# Patient Record
Sex: FEMALE | Race: WHITE | Employment: FULL TIME | URBAN - METROPOLITAN AREA
[De-identification: names, ages, dates, MRNs, and addresses within clinical notes are randomized per-mention and may not be internally consistent; named-entity substitution may affect disease eponyms.]

---

## 2024-11-20 ENCOUNTER — HOSPITAL ENCOUNTER (OUTPATIENT)
Facility: HOSPITAL | Age: 44
Discharge: HOME | End: 2024-11-23
Payer: COMMERCIAL

## 2024-11-20 DIAGNOSIS — Z01.818 PRE-OP TESTING: ICD-10-CM

## 2024-11-20 LAB
ANION GAP SERPL CALC-SCNC: 5 MMOL/L (ref 3–18)
BASOPHILS # BLD: 0 K/UL (ref 0–0.1)
BASOPHILS NFR BLD: 1 % (ref 0–2)
BUN SERPL-MCNC: 13 MG/DL (ref 7–18)
BUN/CREAT SERPL: 15 (ref 12–20)
CALCIUM SERPL-MCNC: 8.8 MG/DL (ref 8.5–10.1)
CHLORIDE SERPL-SCNC: 106 MMOL/L (ref 100–111)
CO2 SERPL-SCNC: 28 MMOL/L (ref 21–32)
CREAT SERPL-MCNC: 0.88 MG/DL (ref 0.6–1.3)
DIFFERENTIAL METHOD BLD: ABNORMAL
EKG ATRIAL RATE: 61 BPM
EKG DIAGNOSIS: NORMAL
EKG P AXIS: 72 DEGREES
EKG P-R INTERVAL: 144 MS
EKG Q-T INTERVAL: 414 MS
EKG QRS DURATION: 80 MS
EKG QTC CALCULATION (BAZETT): 416 MS
EKG R AXIS: 74 DEGREES
EKG T AXIS: 68 DEGREES
EKG VENTRICULAR RATE: 61 BPM
EOSINOPHIL # BLD: 0.3 K/UL (ref 0–0.4)
EOSINOPHIL NFR BLD: 5 % (ref 0–5)
ERYTHROCYTE [DISTWIDTH] IN BLOOD BY AUTOMATED COUNT: 13.1 % (ref 11.6–14.5)
GLUCOSE SERPL-MCNC: 106 MG/DL (ref 74–99)
HCT VFR BLD AUTO: 36.2 % (ref 35–45)
HGB BLD-MCNC: 12.2 G/DL (ref 12–16)
IMM GRANULOCYTES # BLD AUTO: 0 K/UL (ref 0–0.04)
IMM GRANULOCYTES NFR BLD AUTO: 0 % (ref 0–0.5)
LYMPHOCYTES # BLD: 2.7 K/UL (ref 0.9–3.6)
LYMPHOCYTES NFR BLD: 47 % (ref 21–52)
MCH RBC QN AUTO: 30.1 PG (ref 24–34)
MCHC RBC AUTO-ENTMCNC: 33.7 G/DL (ref 31–37)
MCV RBC AUTO: 89.4 FL (ref 78–100)
MONOCYTES # BLD: 0.5 K/UL (ref 0.05–1.2)
MONOCYTES NFR BLD: 9 % (ref 3–10)
NEUTS SEG # BLD: 2.2 K/UL (ref 1.8–8)
NEUTS SEG NFR BLD: 38 % (ref 40–73)
NRBC # BLD: 0 K/UL (ref 0–0.01)
NRBC BLD-RTO: 0 PER 100 WBC
PLATELET # BLD AUTO: 228 K/UL (ref 135–420)
PMV BLD AUTO: 8.6 FL (ref 9.2–11.8)
POTASSIUM SERPL-SCNC: 4.3 MMOL/L (ref 3.5–5.5)
RBC # BLD AUTO: 4.05 M/UL (ref 4.2–5.3)
SODIUM SERPL-SCNC: 139 MMOL/L (ref 136–145)
WBC # BLD AUTO: 5.7 K/UL (ref 4.6–13.2)

## 2024-11-20 PROCEDURE — 85025 COMPLETE CBC W/AUTO DIFF WBC: CPT

## 2024-11-20 PROCEDURE — 93005 ELECTROCARDIOGRAM TRACING: CPT | Performed by: SURGERY

## 2024-11-20 PROCEDURE — 80048 BASIC METABOLIC PNL TOTAL CA: CPT

## 2024-11-20 RX ORDER — AMLODIPINE BESYLATE 5 MG/1
5 TABLET ORAL NIGHTLY
COMMUNITY

## 2024-11-20 RX ORDER — M-VIT,TX,IRON,MINS/CALC/FOLIC 27MG-0.4MG
1 TABLET ORAL DAILY
COMMUNITY

## 2024-11-20 RX ORDER — PHENOL 1.4 %
10 AEROSOL, SPRAY (ML) MUCOUS MEMBRANE NIGHTLY
COMMUNITY

## 2024-11-20 RX ORDER — BUSPIRONE HYDROCHLORIDE 15 MG/1
15 TABLET ORAL 2 TIMES DAILY
COMMUNITY

## 2024-11-20 RX ORDER — ALBUTEROL SULFATE 90 UG/1
2 INHALANT RESPIRATORY (INHALATION) EVERY 6 HOURS PRN
COMMUNITY

## 2024-11-20 RX ORDER — TRAZODONE HYDROCHLORIDE 100 MG/1
100 TABLET ORAL NIGHTLY
COMMUNITY

## 2024-11-20 RX ORDER — LEVOTHYROXINE SODIUM 75 UG/1
75 TABLET ORAL DAILY
COMMUNITY

## 2024-11-20 RX ORDER — LOSARTAN POTASSIUM 25 MG/1
25 TABLET ORAL DAILY
COMMUNITY

## 2024-11-20 RX ORDER — HYDROCHLOROTHIAZIDE 12.5 MG/1
6.25 CAPSULE ORAL DAILY
COMMUNITY

## 2024-11-20 RX ORDER — POLYETHYLENE GLYCOL 3350 17 G/17G
17 POWDER, FOR SOLUTION ORAL DAILY
COMMUNITY

## 2024-11-20 RX ORDER — DULOXETIN HYDROCHLORIDE 30 MG/1
30 CAPSULE, DELAYED RELEASE ORAL DAILY
COMMUNITY

## 2024-11-20 NOTE — PERIOP NOTE
Patient denies diabetes and not on Lithium or Digoxin.     Blood specimen sent to lab for: CBC w/diff and BMP. EKG done today.

## 2024-11-20 NOTE — PERIOP NOTE
SURGICAL PRE-ADMISSION INSTRUCTIONS    Your Medication instructions:    Bring albuterol inhaler on day of surgery and use if needed.    Take or Use these medications the morning of surgery with a sip of water: Levothyroxine, Cymbalta, Buspar    Do Not Take morning of surgery: Losartan, HCTZ        Follow instructions about insulin (Name of Long Acting Insulin) take 1/2 of the prescribed dose the night prior to surgery (Date).    Wear your HOME insulin pump to the hospital. On the morning of surgery, the continued use of the pump will be determined by the type of surgery procedure/expected length of surgery/positioning/equipment expected. Bring EXTRA infusion supplies with you to the hospital.    Follow surgeon's instructions for stopping NSAIDs (i.e. Ibuprofen/Motrin, Advil, BC powder, Aleve/Naproxen, Voltaren cream or tablet, Mobic/Meloxicam, Excedrin, Celebrex, Ketorolac/Toradol, etc). If no instructions provided stop NSAIDs 7 days prior to surgery.    Stop supplements and vitamins (except multivitamins-stop the night prior to surgery) 2 weeks prior to surgery.    Patient denies being on any eye drops, ointment/lotions/gels, patches, injectable medications, phentermine, supplements, vitamins, and herbals at this time. Instructed patient to please notify Preanesthesia Testing at [phone] with any medication changes after the PAT phone appointment.    General Day Surgery Instructions    Do NOT eat or drink anything, including water, ice chips, soda, candy, or gum after MIDNIGHT on the night before surgery, unless you have specific instructions from your Surgeon or Anesthesia Provider to do so.   Please follow instructions for cleaning skin with CHG wash kit 3 days prior to surgery as directed.   Please shower with antibacterial bar soap 2 hours prior to arrival on day of surgery.  No smoking/vaping/chewing tobacco products 24 hours before surgery.  No recreational drugs for one week prior to the day of

## 2024-11-27 ENCOUNTER — ANESTHESIA EVENT (OUTPATIENT)
Facility: HOSPITAL | Age: 44
End: 2024-11-27
Payer: COMMERCIAL

## 2024-11-27 ENCOUNTER — ANESTHESIA (OUTPATIENT)
Facility: HOSPITAL | Age: 44
End: 2024-11-27
Payer: COMMERCIAL

## 2024-11-27 ENCOUNTER — HOSPITAL ENCOUNTER (OUTPATIENT)
Facility: HOSPITAL | Age: 44
Setting detail: OUTPATIENT SURGERY
Discharge: HOME | End: 2024-11-27
Attending: SURGERY | Admitting: SURGERY
Payer: COMMERCIAL

## 2024-11-27 VITALS
SYSTOLIC BLOOD PRESSURE: 111 MMHG | HEIGHT: 66 IN | RESPIRATION RATE: 20 BRPM | BODY MASS INDEX: 22.34 KG/M2 | TEMPERATURE: 97.3 F | WEIGHT: 139 LBS | DIASTOLIC BLOOD PRESSURE: 65 MMHG | OXYGEN SATURATION: 97 % | HEART RATE: 90 BPM

## 2024-11-27 DIAGNOSIS — K40.91 UNILATERAL RECURRENT INGUINAL HERNIA WITHOUT OBSTRUCTION OR GANGRENE: ICD-10-CM

## 2024-11-27 DIAGNOSIS — Z01.818 PRE-OP TESTING: Primary | ICD-10-CM

## 2024-11-27 PROCEDURE — 6370000000 HC RX 637 (ALT 250 FOR IP): Performed by: ANESTHESIOLOGY

## 2024-11-27 PROCEDURE — 2580000003 HC RX 258: Performed by: SURGERY

## 2024-11-27 PROCEDURE — 6360000002 HC RX W HCPCS: Performed by: NURSE ANESTHETIST, CERTIFIED REGISTERED

## 2024-11-27 PROCEDURE — 6360000002 HC RX W HCPCS: Performed by: SURGERY

## 2024-11-27 PROCEDURE — 2500000003 HC RX 250 WO HCPCS: Performed by: NURSE ANESTHETIST, CERTIFIED REGISTERED

## 2024-11-27 PROCEDURE — 6360000002 HC RX W HCPCS: Performed by: ANESTHESIOLOGY

## 2024-11-27 RX ORDER — OXYCODONE HYDROCHLORIDE 5 MG/1
10 TABLET ORAL PRN
Status: COMPLETED | OUTPATIENT
Start: 2024-11-27 | End: 2024-11-27

## 2024-11-27 RX ORDER — ROCURONIUM BROMIDE 10 MG/ML
INJECTION, SOLUTION INTRAVENOUS
Status: DISCONTINUED | OUTPATIENT
Start: 2024-11-27 | End: 2024-11-27 | Stop reason: SDUPTHER

## 2024-11-27 RX ORDER — OXYCODONE HYDROCHLORIDE 5 MG/1
5 TABLET ORAL EVERY 6 HOURS PRN
Qty: 10 TABLET | Refills: 0 | Status: SHIPPED | OUTPATIENT
Start: 2024-11-27 | End: 2024-11-30

## 2024-11-27 RX ORDER — PROPOFOL 10 MG/ML
INJECTION, EMULSION INTRAVENOUS
Status: DISCONTINUED | OUTPATIENT
Start: 2024-11-27 | End: 2024-11-27 | Stop reason: SDUPTHER

## 2024-11-27 RX ORDER — ONDANSETRON 2 MG/ML
INJECTION INTRAMUSCULAR; INTRAVENOUS
Status: DISCONTINUED | OUTPATIENT
Start: 2024-11-27 | End: 2024-11-27 | Stop reason: SDUPTHER

## 2024-11-27 RX ORDER — ONDANSETRON 2 MG/ML
4 INJECTION INTRAMUSCULAR; INTRAVENOUS
Status: DISCONTINUED | OUTPATIENT
Start: 2024-11-27 | End: 2024-11-27 | Stop reason: HOSPADM

## 2024-11-27 RX ORDER — SODIUM CHLORIDE 9 MG/ML
INJECTION, SOLUTION INTRAVENOUS CONTINUOUS
Status: DISCONTINUED | OUTPATIENT
Start: 2024-11-27 | End: 2024-11-27 | Stop reason: HOSPADM

## 2024-11-27 RX ORDER — HYDROMORPHONE HYDROCHLORIDE 1 MG/ML
0.5 INJECTION, SOLUTION INTRAMUSCULAR; INTRAVENOUS; SUBCUTANEOUS EVERY 5 MIN PRN
Status: COMPLETED | OUTPATIENT
Start: 2024-11-27 | End: 2024-11-27

## 2024-11-27 RX ORDER — FENTANYL CITRATE 50 UG/ML
INJECTION, SOLUTION INTRAMUSCULAR; INTRAVENOUS
Status: DISCONTINUED | OUTPATIENT
Start: 2024-11-27 | End: 2024-11-27 | Stop reason: SDUPTHER

## 2024-11-27 RX ORDER — MIDAZOLAM HYDROCHLORIDE 1 MG/ML
INJECTION, SOLUTION INTRAMUSCULAR; INTRAVENOUS
Status: DISCONTINUED | OUTPATIENT
Start: 2024-11-27 | End: 2024-11-27 | Stop reason: SDUPTHER

## 2024-11-27 RX ORDER — OXYCODONE HYDROCHLORIDE 5 MG/1
5 TABLET ORAL PRN
Status: COMPLETED | OUTPATIENT
Start: 2024-11-27 | End: 2024-11-27

## 2024-11-27 RX ORDER — EPHEDRINE SULFATE 5 MG/ML
INJECTION INTRAVENOUS
Status: DISCONTINUED | OUTPATIENT
Start: 2024-11-27 | End: 2024-11-27 | Stop reason: SDUPTHER

## 2024-11-27 RX ORDER — KETOROLAC TROMETHAMINE 30 MG/ML
INJECTION, SOLUTION INTRAMUSCULAR; INTRAVENOUS
Status: DISCONTINUED | OUTPATIENT
Start: 2024-11-27 | End: 2024-11-27 | Stop reason: SDUPTHER

## 2024-11-27 RX ORDER — LIDOCAINE HYDROCHLORIDE 20 MG/ML
INJECTION, SOLUTION EPIDURAL; INFILTRATION; INTRACAUDAL; PERINEURAL
Status: DISCONTINUED | OUTPATIENT
Start: 2024-11-27 | End: 2024-11-27 | Stop reason: SDUPTHER

## 2024-11-27 RX ORDER — NALOXONE HYDROCHLORIDE 0.4 MG/ML
INJECTION, SOLUTION INTRAMUSCULAR; INTRAVENOUS; SUBCUTANEOUS PRN
Status: DISCONTINUED | OUTPATIENT
Start: 2024-11-27 | End: 2024-11-27 | Stop reason: HOSPADM

## 2024-11-27 RX ORDER — BUPIVACAINE HYDROCHLORIDE 2.5 MG/ML
INJECTION, SOLUTION EPIDURAL; INFILTRATION; INTRACAUDAL PRN
Status: DISCONTINUED | OUTPATIENT
Start: 2024-11-27 | End: 2024-11-27 | Stop reason: ALTCHOICE

## 2024-11-27 RX ORDER — FENTANYL CITRATE 50 UG/ML
50 INJECTION, SOLUTION INTRAMUSCULAR; INTRAVENOUS EVERY 5 MIN PRN
Status: DISCONTINUED | OUTPATIENT
Start: 2024-11-27 | End: 2024-11-27 | Stop reason: HOSPADM

## 2024-11-27 RX ORDER — DEXAMETHASONE SODIUM PHOSPHATE 4 MG/ML
INJECTION, SOLUTION INTRA-ARTICULAR; INTRALESIONAL; INTRAMUSCULAR; INTRAVENOUS; SOFT TISSUE
Status: DISCONTINUED | OUTPATIENT
Start: 2024-11-27 | End: 2024-11-27 | Stop reason: SDUPTHER

## 2024-11-27 RX ADMIN — PROPOFOL 200 MG: 10 INJECTION, EMULSION INTRAVENOUS at 14:36

## 2024-11-27 RX ADMIN — DEXAMETHASONE SODIUM PHOSPHATE 8 MG: 4 INJECTION INTRA-ARTICULAR; INTRALESIONAL; INTRAMUSCULAR; INTRAVENOUS; SOFT TISSUE at 14:48

## 2024-11-27 RX ADMIN — ROCURONIUM BROMIDE 10 MG: 10 INJECTION, SOLUTION INTRAVENOUS at 14:51

## 2024-11-27 RX ADMIN — SODIUM CHLORIDE: 9 INJECTION, SOLUTION INTRAVENOUS at 10:03

## 2024-11-27 RX ADMIN — MIDAZOLAM 2 MG: 1 INJECTION INTRAMUSCULAR; INTRAVENOUS at 14:30

## 2024-11-27 RX ADMIN — LIDOCAINE HYDROCHLORIDE 80 MG: 20 INJECTION, SOLUTION EPIDURAL; INFILTRATION; INTRACAUDAL; PERINEURAL at 14:36

## 2024-11-27 RX ADMIN — KETOROLAC TROMETHAMINE 15 MG: 30 INJECTION, SOLUTION INTRAMUSCULAR; INTRAVENOUS at 15:15

## 2024-11-27 RX ADMIN — FENTANYL CITRATE 100 MCG: 50 INJECTION, SOLUTION INTRAMUSCULAR; INTRAVENOUS at 14:30

## 2024-11-27 RX ADMIN — ONDANSETRON 4 MG: 2 INJECTION INTRAMUSCULAR; INTRAVENOUS at 14:35

## 2024-11-27 RX ADMIN — HYDROMORPHONE HYDROCHLORIDE 0.5 MG: 1 INJECTION, SOLUTION INTRAMUSCULAR; INTRAVENOUS; SUBCUTANEOUS at 15:50

## 2024-11-27 RX ADMIN — SUGAMMADEX 130 MG: 100 INJECTION, SOLUTION INTRAVENOUS at 15:17

## 2024-11-27 RX ADMIN — HYDROMORPHONE HYDROCHLORIDE 0.5 MG: 1 INJECTION, SOLUTION INTRAMUSCULAR; INTRAVENOUS; SUBCUTANEOUS at 15:43

## 2024-11-27 RX ADMIN — EPHEDRINE SULFATE 10 MG: 5 INJECTION INTRAVENOUS at 14:43

## 2024-11-27 RX ADMIN — ROCURONIUM BROMIDE 40 MG: 10 INJECTION, SOLUTION INTRAVENOUS at 14:36

## 2024-11-27 RX ADMIN — FENTANYL CITRATE 50 MCG: 50 INJECTION, SOLUTION INTRAMUSCULAR; INTRAVENOUS at 15:18

## 2024-11-27 RX ADMIN — CEFOXITIN 2000 MG: 1 INJECTION, POWDER, FOR SOLUTION INTRAVENOUS at 14:30

## 2024-11-27 RX ADMIN — OXYCODONE 10 MG: 5 TABLET ORAL at 16:24

## 2024-11-27 RX ADMIN — FENTANYL CITRATE 50 MCG: 50 INJECTION, SOLUTION INTRAMUSCULAR; INTRAVENOUS at 15:20

## 2024-11-27 RX ADMIN — FENTANYL CITRATE 50 MCG: 50 INJECTION INTRAMUSCULAR; INTRAVENOUS at 15:59

## 2024-11-27 ASSESSMENT — PAIN DESCRIPTION - PAIN TYPE
TYPE: ACUTE PAIN;SURGICAL PAIN
TYPE: SURGICAL PAIN
TYPE: ACUTE PAIN;SURGICAL PAIN

## 2024-11-27 ASSESSMENT — PAIN DESCRIPTION - LOCATION
LOCATION: ABDOMEN

## 2024-11-27 ASSESSMENT — PAIN DESCRIPTION - ONSET
ONSET: ON-GOING

## 2024-11-27 ASSESSMENT — PAIN SCALES - GENERAL
PAINLEVEL_OUTOF10: 0
PAINLEVEL_OUTOF10: 4
PAINLEVEL_OUTOF10: 0
PAINLEVEL_OUTOF10: 7
PAINLEVEL_OUTOF10: 0
PAINLEVEL_OUTOF10: 4
PAINLEVEL_OUTOF10: 6
PAINLEVEL_OUTOF10: 8
PAINLEVEL_OUTOF10: 0
PAINLEVEL_OUTOF10: 7

## 2024-11-27 ASSESSMENT — PAIN - FUNCTIONAL ASSESSMENT
PAIN_FUNCTIONAL_ASSESSMENT: ACTIVITIES ARE NOT PREVENTED
PAIN_FUNCTIONAL_ASSESSMENT: 0-10
PAIN_FUNCTIONAL_ASSESSMENT: ACTIVITIES ARE NOT PREVENTED

## 2024-11-27 ASSESSMENT — PAIN DESCRIPTION - DESCRIPTORS
DESCRIPTORS: ACHING
DESCRIPTORS: ACHING;SHARP
DESCRIPTORS: ACHING

## 2024-11-27 ASSESSMENT — PAIN DESCRIPTION - FREQUENCY
FREQUENCY: CONTINUOUS

## 2024-11-27 ASSESSMENT — LIFESTYLE VARIABLES: SMOKING_STATUS: 0

## 2024-11-27 NOTE — H&P
rubs.  Vascular  Normal  Capillary refill - Less than 2 seconds.  Abdomen  *  Hernia - Positive. Type: inguinal. Location: left, inguinal. Features: tender. Reducible. possible femoral hernia .  Abdomen  Normal  Inspection - Normal. No abdominal tenderness. No hepatic enlargement. No spleen enlargement.  Musculoskeletal  Normal  Visual overview of all four extremities is normal.  Extremity  Normal  No edema.  Neurological  Normal  Memory - Normal.  Psychiatric  Normal  Orientation - Oriented to time, place, person & situation.    Medications (added, continued, or stopped this visit)  Start Date  Medication  Directions  PRN Status  PRN Reason  Instruction  Stop Date  10/08/2024  amoxicillin 875 mg tablet  TAKE 1 TABLET BY MOUTH TWICE A DAY  N        10/08/2024  fluconazole 150 mg tablet  TAKE 1 TABLET BY MOUTH AS ONE DOSE  N        01/20/2024  oseltamivir 75 mg capsule  take 1 capsule by mouth twice a day  N        To Be Scheduled / Ordered  Status  Order  Reason  Assessment  Timeframe  Appointment  ordered  CT Abdomen And Pelvis W/ DYE    K40.31      ordered  Urine Culture, Routine    N39.0      ordered  UA Microscopic    N39.0        Lab Studies  Status  Lab Study  Schedule Timeframe  Schedule Date  Comments  Interpretation  Value  ordered  UA Microscopic            ordered  Urine Culture, Routine

## 2024-11-27 NOTE — BRIEF OP NOTE
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 Item Serial No.  Lot No. LRB No. Used Action   MESH ROOPA [id number] POLY POLYLACTIC ACID 70% CLLGN 30% GLYC - [id number]  MESH ROOPA [id number] POLY POLYLACTIC ACID 70% CLLGN 30% GLYC  [facility] SUR[facility]WD [id number] Left 1 Implanted         Drains: * No LDAs found *    Findings:  Infection Present At Time Of Surgery (PATOS) (choose all levels that have infection present):  No infection present  Other Findings: none    Electronically signed by Az Lilly MD on 11/27/2024 at 3:14 PM

## 2024-11-27 NOTE — ANESTHESIA PRE PROCEDURE
headaches, psychiatric history: stable with treatment            GI/Hepatic/Renal: Neg GI/Hepatic/Renal ROS  (+) GERD: well controlled     (-) hiatal hernia       Endo/Other: Negative Endo/Other ROS                    Abdominal:             Vascular: negative vascular ROS.         Other Findings:       Anesthesia Plan      general     ASA 2       Induction: intravenous.    MIPS: Postoperative opioids intended.  Anesthetic plan and risks discussed with patient.      Plan discussed with CRNA.                Quentin Casiano MD   11/27/2024

## 2024-11-27 NOTE — PERIOP NOTE
Reviewed PTA medication list with patient/caregiver and patient/caregiver denies any additional medications.     Patient admits to having a responsible adult care for them at home for at least 24 hours after surgery.    Patient encouraged to use gown warming system and informed that using said warming gown to regulate body temperature prior to a procedure has been shown to help reduce the risks of blood clots and infection.    Patient's pharmacy of choice verified and documented in PTA medication section.    Dual skin assessment & fall risk band verification completed with ANA LAURA Guerrero.

## 2024-11-27 NOTE — ANESTHESIA POSTPROCEDURE EVALUATION
Post-Anesthesia Evaluation and Assessment    Cardiovascular Function/Vital Signs  Visit Vitals  /62   Pulse 86   Temp 97.3 °F (36.3 °C) (Temporal)   Resp 20   Ht 1.676 m (5' 6\")   Wt 63 kg (139 lb)   SpO2 100%   BMI 22.44 kg/m²       Patient is status post Procedure(s):  ROBOTIC REPAIR RECURRENT LEFT INGUINAL HERNIA WITH MESH/NEURECTOMY.    Nausea/Vomiting: Controlled.    Postoperative hydration reviewed and adequate.    Pain:      Managed.    Neurological Status:       At baseline.    Mental Status and Level of Consciousness: Progressing to baseline appropriately     Pulmonary Status:       Adequate oxygenation and airway patent.    Complications related to anesthesia: None    Post-anesthesia assessment completed. No concerns.        Signed By: CAREY EVANS MD    November 27, 2024

## 2024-11-27 NOTE — PROGRESS NOTES
Patient will undergo robotic left inguinal hernia repair.  Although CT scan did not show a definitive hernia on exam she obviously has a hernia which appears to be a femoral hernia on examination.  I have discussed risk benefits alternatives to her she understands and wishes to proceed.

## 2024-11-27 NOTE — DISCHARGE INSTRUCTIONS
Post-Operative Discharge Instructions  Az Lilly M.D.  [address], [address], [place] [address] [phone]    Patient: Bessy Stoddard MRN: [id number]  [facility]: [id number]    YOB: 1980  Age: 44 y.o.  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    Activity  Do not drive a car or operate any hazardous machinery the day of surgery.  Rest quietly today.  No bending or heavy lifting.  You may resume other prior to surgery activities as tolerated.  You may remove the bandage and shower in 1 day.    Drain / Wound Care  Follow all drain / wound care instructions exactly as explained by the Nurse at time of discharge.  Apply an ice pack to the surgical site for 48 hours.  Do not put any salves or ointments on the wound.  Allow it to form a dry scab.  Leave steri-strips / Dermabond alone.  They should be allowed to fall off on their own in 7-14 days.    Medications  It is important to take your medications exactly as they are prescribed.  Keep your medication in the bottles provided by the pharmacist, and keep a list of the medication names, dosages, and times they should be taken in your wallet.    Call 911 anytime you think you may need emergency care. For example, call if:  You passed out (lost consciousness).  You have severe trouble breathing.  You have sudden chest pain and shortness of breath.    Notify your Surgeon for any of the following:  Fever, chills, nausea, vomiting, severe abdominal pain or bleeding.  If you experience any redness or discharge or sign of infection.  Persistent nausea lasting more than 24 hours.    If you are unable to reach your Surgeon for any of the symptoms above, you should proceed

## 2024-11-27 NOTE — PERIOP NOTE
Patient and family have been updated on delay status, no further questions or concerns at this time.

## 2024-11-27 NOTE — PERIOP NOTE
Patient tolerates PO crackers and fluids. Discharge instructions complete. Opportunity for questions given. Encouraged PO fluids. Patient and caregiver verbalized understanding of instructions.

## 2024-11-27 NOTE — OP NOTE
[facility] Operative Report    Bessy Stoddard  [id number]  1980  [unfilled]  11/27/2024  Date of Surgery: 11/27/2024    PREOPERATIVE DIAGNOSIS: Recurrent left inguinal hernia/Neuroma    POSTOPERATIVE DIAGNOSIS: Recurrent left inguinal hernia/Neuroma/round ligament congestion    PROCEDURES PERFORMED: Robotic  repair of Recurrent left inguinal hernia /Neurectomy with Impantation    SURGEON: Az Lilly MD    ANESTHESIA: General endotracheal    Assistant:  Surgical Assistant: Linda Hernandez  Scrub Person First: Mohini Wallace    Implants:  Implant Name Type Inv. Item Serial No.  Lot No. LRB No. Used Action   MESH ROOPA [id number] POLY POLYLACTIC ACID 70% CLLGN 30% GLYC - [id number]  MESH ROOPA [id number] POLY POLYLACTIC ACID 70% CLLGN 30% GLYC  [employer]  SURGICAL-WD [id number] Left 1 Implanted       Estimated Blood Loss: min    Complications: none    SPECIMENS REMOVED: * No specimens in log * .    INDICATIONS: This is a 44 y.o. femalewho presents with a symptomatic Recurrent inguinal hernia.    DESCRIPTION OF PROCEDURE: The patient was placed in supine position. Patient's abdomen was prepped and draped in the usual fashion. After adequate sedation a Versus needle was inserted into the LUQ at [place] and using one drop pneumoperitoneum was established. An incision was made above the  umbilicus with the knife and carried down through subcutaneous tissues with the electrocautery.  After this was done, an 8-mm robotic port was placed in the supraumbilical position under visualization.  Next a 8-mm AirSeal port was placed in the epigastrium as a working port.  Two 8-mm robotic ports were placed, one on the right side and one on the left side of the abdominal wall under visualization.  The robot was docked in a routine fashion.  The abdomen was inspected.  The patient was found to have a in direct left inguinal hernia which was a indirect defect.  The

## 2024-11-27 NOTE — PERIOP NOTE
TRANSFER - OUT REPORT:    Verbal report given to Bacilio DU  on Bessy Stoddard  being transferred to Phase II  for routine progression of patient care       Report consisted of patient's Situation, Background, Assessment and   Recommendations(SBAR).     Information from the following report(s) MAR, Recent Results, and Med Rec Status was reviewed with the receiving nurse.           Lines:   Peripheral IV 11/27/24 Posterior;Right Forearm (Active)   Site Assessment Clean, dry & intact 11/27/24 1606   Line Status Infusing 11/27/24 1606   Line Care Connections checked and tightened 11/27/24 1530   Phlebitis Assessment No symptoms 11/27/24 1606   Infiltration Assessment 0 11/27/24 1606   Alcohol Cap Used Yes 11/27/24 1002   Dressing Status Clean, dry & intact 11/27/24 1606   Dressing Type Transparent 11/27/24 1606   Dressing Intervention New 11/27/24 1002       Peripheral IV 11/27/24 Left;Posterior Hand (Active)   Site Assessment Clean, dry & intact 11/27/24 1606   Line Status Infusing 11/27/24 1606   Line Care Connections checked and tightened 11/27/24 1530   Phlebitis Assessment No symptoms 11/27/24 1606   Infiltration Assessment 0 11/27/24 1606   Alcohol Cap Used Yes 11/27/24 1024   Dressing Status Clean, dry & intact 11/27/24 1606   Dressing Type Transparent 11/27/24 1606   Dressing Intervention New 11/27/24 1024        Opportunity for questions and clarification was provided.      Patient transported with:  Registered Nurse

## 2024-11-27 NOTE — PERIOP NOTE
TRANSFER - IN REPORT:    Verbal report received from Deidra DENNIS RN on Bessy Stoddard  being received from PACU for routine progression of patient care      Report consisted of patient's Situation, Background, Assessment and   Recommendations(SBAR).     Information from the following report(s) Nurse Handoff Report, Surgery Report, Intake/Output, and MAR was reviewed with the receiving nurse.    Opportunity for questions and clarification was provided.      Assessment completed upon patient's arrival to unit and care assumed.